# Patient Record
(demographics unavailable — no encounter records)

---

## 2017-08-30 NOTE — REP
Upper GI air contrast

 

The procedure was performed under the direct supervision of Dr. Olson. The images

were reviewed with Dr. Olson

 

Liquid barium and gas producing crystals were given in the erect position as well

as liquid barium in the prone oblique position in order to perform a double

contrast upper GI examination.

 

The oral and pharyngeal stages of deglutition are unremarkable.  Esophageal

transport is prompt and efficient and there is no esophagitis or stricture

mucosal ring or hiatal hernia.  Gastroesophageal reflux is not identified on this

examination.

 

The stomach walls are normally outlined .  The rugal folds are smooth and

regular.  There is no gastritis neoplasm or ulcer disease.

 

In the post bulbar duodenum there are thickened folds which may represent

duodenitis. There is no amber ulcer identified. The visualized portion of the

proximal small bowel appears normal in course and caliber.

 

Impression:

In the post bulbar duodenum there are thickened folds which may represent

duodenitis. There is no amber ulcer identified. Otherwise unremarkable double

contrast upper GI examination.

 

3 minutes and 42 seconds of fluoro time was utilized for this procedure.

 

 

Reviewed by

NOELLE Lester 08/30/2017 04:37 PSigned by

Danial Olson MD 08/30/2017 05:02 P

## 2017-09-27 NOTE — REP
Clinical:  Lumbago and sciatica .

 

Technique:  AP, lateral, bilateral oblique, and coned-down views.

 

Findings:  Alignment and lordosis is maintained.  The vertebral bodies including

transverse process and spinous processes are intact and normal.  There is no

evidence for acute fracture / compression injury or subluxation.  No evidence for

spondylolysis or spondylolisthesis.  No significant degenerative change is

noted.

 

Impression:

Normal lumbosacral spine radiograph series.

 

 

Signed by

Vineet Means MD 09/27/2017 02:30 A